# Patient Record
Sex: FEMALE | Race: BLACK OR AFRICAN AMERICAN | NOT HISPANIC OR LATINO | Employment: OTHER | URBAN - METROPOLITAN AREA
[De-identification: names, ages, dates, MRNs, and addresses within clinical notes are randomized per-mention and may not be internally consistent; named-entity substitution may affect disease eponyms.]

---

## 2018-02-08 ENCOUNTER — TRANSCRIBE ORDERS (OUTPATIENT)
Dept: ADMINISTRATIVE | Facility: HOSPITAL | Age: 63
End: 2018-02-08

## 2018-02-08 DIAGNOSIS — M17.11 OSTEOARTHRITIS OF RIGHT KNEE, UNSPECIFIED OSTEOARTHRITIS TYPE: Primary | ICD-10-CM

## 2018-02-22 ENCOUNTER — APPOINTMENT (OUTPATIENT)
Dept: RADIOLOGY | Facility: CLINIC | Age: 63
End: 2018-02-22
Payer: COMMERCIAL

## 2018-02-22 DIAGNOSIS — M17.11 OSTEOARTHRITIS OF RIGHT KNEE, UNSPECIFIED OSTEOARTHRITIS TYPE: ICD-10-CM

## 2018-02-22 PROCEDURE — 73562 X-RAY EXAM OF KNEE 3: CPT

## 2018-11-19 ENCOUNTER — TELEPHONE (OUTPATIENT)
Dept: PAIN MEDICINE | Facility: CLINIC | Age: 63
End: 2018-11-19

## 2018-11-26 NOTE — TELEPHONE ENCOUNTER
Pt called stating that she was referred to Dr Nano North and wanted to know if she could schedule an appointment  Pt made aware that we did receive the referral and are waiting to get her prior records sent from Dr Hernandez Kos office  Let patient know that once we receive her records and Dr Nano North has a chance to review them, she will get a call back from the office  Pt appreciative

## 2018-12-03 NOTE — TELEPHONE ENCOUNTER
Lm on vm for pt to c/b  Pls let her know we have not received med recs from Dr Christina Cheung and there are none in the system  She can have them fax them to us

## 2018-12-03 NOTE — TELEPHONE ENCOUNTER
S/w patient, she will call Dr Gómez Dexter office and provide fax # [phone] to have records sent

## 2018-12-05 NOTE — TELEPHONE ENCOUNTER
S/w patient, no intake within in EHR, permission to schedule per notes from office/Dr Deuce Perez  Patient has [employer], no referral in EHR  Per patient she is being referred by Dr Jessenia Nash who can no longer give her injections  Per patient she has LBP and herniated discs in her low back  Patient is scheduled for consult 1/17/18    Patient advised that they should receive the NP paperwork in the mail prior to their appointment  If they do not receive the paperwork; or if the appointment is within 3-7 days they can print it off the spine and pain website:  [employer] or Arrive 45 minutes prior to their appointment time, or retrieve it from the practice in advance to their appointment  It will take approximately 30 minutes to complete         Patient requests NP pw mailed to address confirmed on file

## 2019-01-07 ENCOUNTER — OFFICE VISIT (OUTPATIENT)
Dept: PAIN MEDICINE | Facility: CLINIC | Age: 64
End: 2019-01-07
Payer: COMMERCIAL

## 2019-01-07 VITALS
HEART RATE: 60 BPM | WEIGHT: 177 LBS | HEIGHT: 60 IN | BODY MASS INDEX: 34.75 KG/M2 | DIASTOLIC BLOOD PRESSURE: 90 MMHG | SYSTOLIC BLOOD PRESSURE: 146 MMHG

## 2019-01-07 DIAGNOSIS — M19.011 ARTHRITIS OF RIGHT SHOULDER REGION: ICD-10-CM

## 2019-01-07 DIAGNOSIS — G89.4 CHRONIC PAIN SYNDROME: ICD-10-CM

## 2019-01-07 DIAGNOSIS — M54.16 LUMBAR RADICULOPATHY: Primary | ICD-10-CM

## 2019-01-07 DIAGNOSIS — M17.11 PRIMARY OSTEOARTHRITIS OF RIGHT KNEE: ICD-10-CM

## 2019-01-07 PROCEDURE — 99204 OFFICE O/P NEW MOD 45 MIN: CPT | Performed by: ANESTHESIOLOGY

## 2019-01-07 RX ORDER — LOSARTAN POTASSIUM 50 MG/1
50 TABLET ORAL DAILY
Refills: 3 | COMMUNITY
Start: 2018-11-08

## 2019-01-07 RX ORDER — GABAPENTIN 800 MG/1
TABLET ORAL
COMMUNITY

## 2019-01-07 RX ORDER — CARVEDILOL 6.25 MG/1
6.25 TABLET ORAL
COMMUNITY
Start: 2018-11-13

## 2019-01-07 RX ORDER — AMLODIPINE BESYLATE 5 MG/1
5 TABLET ORAL
COMMUNITY
Start: 2018-11-13

## 2019-01-07 RX ORDER — HYDROCODONE BITARTRATE AND ACETAMINOPHEN 5; 325 MG/1; MG/1
1 TABLET ORAL 3 TIMES DAILY PRN
Refills: 0 | COMMUNITY
Start: 2018-12-15

## 2019-01-07 NOTE — PROGRESS NOTES
Assessment:  1  Lumbar radiculopathy  MRI lumbar spine without contrast   2  Chronic pain syndrome     3  Primary osteoarthritis of right knee  [place] spine and pain procedure   4  Arthritis of right shoulder region       Plan: This is a 58-year-old female who presents today for initial consultation for management of low back and bilateral knee pain  Patient has completed a 6 weak course of physical therapy  X-ray of right knee demonstrates osteoarthritis and medial compartment joint space narrowing   At this time, I recommend she resumes physical therapy for now  In in the meantime, I will order imaging study, MRI of the lumbosacral spine without contrast   Patient may benefit from pain interventions  In the interim, patient will continue with gabapentin 800 mg p o  t i d  and hydrocodone acetaminophen 5/325 mg p o  t i d  Prescriptions are written by Dr Alysa Varma  Follow up 6 weeks review MRI and reassess PT progress  My impressions and treatment recommendations were discussed in detail with the patient who verbalized understanding and had no further questions  Discharge instructions were provided  I personally saw and examined the patient and I agree with the above discussed plan of care  No orders of the defined types were placed in this encounter  New Medications Ordered This Visit   Medications    amLODIPine (NORVASC) 5 mg tablet     Sig: Take 5 mg by mouth    carvedilol (COREG) 6 25 mg tablet     Sig: Take 6 25 mg by mouth    losartan (COZAAR) 50 mg tablet     Sig: Take 50 mg by mouth daily     Refill:  3    gabapentin (NEURONTIN) 800 mg tablet     Sig: TAKE 1 TABLET 3 TIMES A DAY       HYDROcodone-acetaminophen (NORCO) 5-325 mg per tablet     Sig: Take 1 tablet by mouth 3 (three) times a day as needed     Refill:  0       History of Present Illness:    Valerio Dave is a 59 y o  female who presents today for initial consultation for management of low back pain and bilateral knee pain  Of note, patient reports that her pain is due to stroke, arthritis  She reports moderate to severe pain  Pain is rated 10/10  Pain is constant, worse in the morning  Patient further describes pain as burning, shooting, sharp, pressure-like and throbbing sensation  Low back pain radiates down her legs bilaterally  Her pain is aggravated with prolonged standing, bending, sitting, walking and exercise  Patient has completed physical therapy for 6 weeks with moderate relief of pain  She has resumed PT again  She uses heat and ice treatment  She reports prior injection which helped  Her last injection was several years ago  Patient is currently on hydrocodone acetaminophen 5/325 mg p o  t i d  She also takes gabapentin 800 mg p o  t i d  She denies loss of bladder and or bowel  She denies fever or chills  I have personally reviewed and/or updated the patient's past medical history, past surgical history, family history, social history, current medications, allergies, and vital signs today  Review of Systems:    Review of Systems   Constitutional: Negative for fever and unexpected weight change  HENT: Positive for trouble swallowing  Eyes: Negative for visual disturbance  Respiratory: Positive for shortness of breath  Negative for wheezing  Cardiovascular: Negative for chest pain and palpitations  Gastrointestinal: Positive for constipation  Negative for diarrhea, nausea and vomiting  Endocrine: Negative for cold intolerance, heat intolerance and polydipsia  Genitourinary: Positive for difficulty urinating  Negative for frequency  Musculoskeletal: Positive for gait problem  Negative for arthralgias, joint swelling and myalgias  Joint stiffness, Decreased ROM   Skin: Negative for rash  Neurological: Positive for dizziness and weakness  Negative for seizures, syncope and headaches  Hematological: Bruises/bleeds easily  Psychiatric/Behavioral: Negative for dysphoric mood  All other systems reviewed and are negative  There is no problem list on file for this patient  No past medical history on file  No past surgical history on file  No family history on file  Social History     Occupational History    Not on file  Social History Main Topics    Smoking status: Not on file    Smokeless tobacco: Not on file    Alcohol use Not on file    Drug use: Unknown    Sexual activity: Not on file       No current outpatient prescriptions on file prior to visit  No current facility-administered medications on file prior to visit  Allergies   Allergen Reactions    Latex Hives    Oxycodone-Acetaminophen Other (See Comments) and Hives    Sulfa Antibiotics Hives       Physical Exam:    /90   Pulse 60   Ht 5' (1 524 m)   Wt 80 3 kg (177 lb)   BMI 34 57 kg/m²     Constitutional: normal, well developed, well nourished, alert, in no distress and non-toxic and no overt pain behavior    Eyes: anicteric  HEENT: grossly intact  Neck: supple, symmetric, trachea midline and no masses   Pulmonary:even and unlabored  Cardiovascular:No edema or pitting edema present  Skin:Normal without rashes or lesions and well hydrated  Psychiatric:Mood and affect appropriate  Neurologic:Cranial Nerves II-XII grossly intact  Musculoskeletal:normal    Lumbar Spine Exam    Appearance:  Normal lordosis  Palpation/Tenderness:  left lumbar paraspinal tenderness  right lumbar paraspinal tenderness  Sensory:  no sensory deficits noted  Range of Motion:  Extension:  Moderately limited  with pain  Motor Strength:  Left foot dorsiflexion:  5/5  Left foot plantar flexion:  5/5  Right foot dorsiflexion:  3/5  Right foot plantar flexion:  4/5  Reflexes:  Left Patellar:  2+   Right Patellar:  2+     Imaging

## 2019-01-08 ENCOUNTER — TELEPHONE (OUTPATIENT)
Dept: RADIOLOGY | Facility: CLINIC | Age: 64
End: 2019-01-08

## 2019-01-22 ENCOUNTER — HOSPITAL ENCOUNTER (OUTPATIENT)
Dept: RADIOLOGY | Facility: CLINIC | Age: 64
Discharge: HOME | End: 2019-01-22
Attending: ANESTHESIOLOGY | Admitting: ANESTHESIOLOGY
Payer: COMMERCIAL

## 2019-01-22 VITALS
RESPIRATION RATE: 18 BRPM | DIASTOLIC BLOOD PRESSURE: 73 MMHG | OXYGEN SATURATION: 92 % | TEMPERATURE: 98.3 F | HEART RATE: 99 BPM | SYSTOLIC BLOOD PRESSURE: 94 MMHG

## 2019-01-22 DIAGNOSIS — M17.11 PRIMARY OSTEOARTHRITIS OF RIGHT KNEE: ICD-10-CM

## 2019-01-22 PROCEDURE — 20610 DRAIN/INJ JOINT/BURSA W/O US: CPT | Performed by: ANESTHESIOLOGY

## 2019-01-22 PROCEDURE — 77002 NEEDLE LOCALIZATION BY XRAY: CPT | Performed by: ANESTHESIOLOGY

## 2019-01-22 PROCEDURE — 77002 NEEDLE LOCALIZATION BY XRAY: CPT

## 2019-01-22 RX ORDER — METHYLPREDNISOLONE ACETATE 80 MG/ML
80 INJECTION, SUSPENSION INTRA-ARTICULAR; INTRALESIONAL; INTRAMUSCULAR; PARENTERAL; SOFT TISSUE ONCE
Status: COMPLETED | OUTPATIENT
Start: 2019-01-22 | End: 2019-01-22

## 2019-01-22 RX ORDER — BUPIVACAINE HCL/PF 2.5 MG/ML
5 VIAL (ML) INJECTION ONCE
Status: COMPLETED | OUTPATIENT
Start: 2019-01-22 | End: 2019-01-22

## 2019-01-22 RX ORDER — LIDOCAINE HYDROCHLORIDE 10 MG/ML
5 INJECTION, SOLUTION EPIDURAL; INFILTRATION; INTRACAUDAL; PERINEURAL ONCE
Status: COMPLETED | OUTPATIENT
Start: 2019-01-22 | End: 2019-01-22

## 2019-01-22 RX ADMIN — IOHEXOL 1 ML: 300 INJECTION, SOLUTION INTRAVENOUS at 14:18

## 2019-01-22 RX ADMIN — Medication 5 ML: at 14:18

## 2019-01-22 RX ADMIN — LIDOCAINE HYDROCHLORIDE 5 ML: 10 INJECTION, SOLUTION EPIDURAL; INFILTRATION; INTRACAUDAL; PERINEURAL at 14:17

## 2019-01-22 RX ADMIN — METHYLPREDNISOLONE ACETATE 80 MG: 80 INJECTION, SUSPENSION INTRA-ARTICULAR; INTRALESIONAL; INTRAMUSCULAR; PARENTERAL; SOFT TISSUE at 14:20

## 2019-01-22 NOTE — INTERVAL H&P NOTE
Update: (This section must be completed if the H&P was completed greater than 24 hrs to procedure or admission)    H&P reviewed  After examining the patient, I find no changed to the H&P since it had been written  Patient re-evaluated   Accept as history and physical     Meli Montoya MD/January 22, 2019/1:58 PM

## 2019-01-22 NOTE — H&P (VIEW-ONLY)
Assessment:  1  Lumbar radiculopathy  MRI lumbar spine without contrast   2  Chronic pain syndrome     3  Primary osteoarthritis of right knee  [place] spine and pain procedure   4  Arthritis of right shoulder region       Plan: This is a 79-year-old female who presents today for initial consultation for management of low back and bilateral knee pain  Patient has completed a 6 weak course of physical therapy  X-ray of right knee demonstrates osteoarthritis and medial compartment joint space narrowing   At this time, I recommend she resumes physical therapy for now  In in the meantime, I will order imaging study, MRI of the lumbosacral spine without contrast   Patient may benefit from pain interventions  In the interim, patient will continue with gabapentin 800 mg p o  t i d  and hydrocodone acetaminophen 5/325 mg p o  t i d  Prescriptions are written by Dr Norwood Fly  Follow up 6 weeks review MRI and reassess PT progress  My impressions and treatment recommendations were discussed in detail with the patient who verbalized understanding and had no further questions  Discharge instructions were provided  I personally saw and examined the patient and I agree with the above discussed plan of care  No orders of the defined types were placed in this encounter  New Medications Ordered This Visit   Medications    amLODIPine (NORVASC) 5 mg tablet     Sig: Take 5 mg by mouth    carvedilol (COREG) 6 25 mg tablet     Sig: Take 6 25 mg by mouth    losartan (COZAAR) 50 mg tablet     Sig: Take 50 mg by mouth daily     Refill:  3    gabapentin (NEURONTIN) 800 mg tablet     Sig: TAKE 1 TABLET 3 TIMES A DAY       HYDROcodone-acetaminophen (NORCO) 5-325 mg per tablet     Sig: Take 1 tablet by mouth 3 (three) times a day as needed     Refill:  0       History of Present Illness:    America Dave is a 59 y o  female who presents today for initial consultation for management of low back pain and bilateral knee pain  Of note, patient reports that her pain is due to stroke, arthritis  She reports moderate to severe pain  Pain is rated 10/10  Pain is constant, worse in the morning  Patient further describes pain as burning, shooting, sharp, pressure-like and throbbing sensation  Low back pain radiates down her legs bilaterally  Her pain is aggravated with prolonged standing, bending, sitting, walking and exercise  Patient has completed physical therapy for 6 weeks with moderate relief of pain  She has resumed PT again  She uses heat and ice treatment  She reports prior injection which helped  Her last injection was several years ago  Patient is currently on hydrocodone acetaminophen 5/325 mg p o  t i d  She also takes gabapentin 800 mg p o  t i d  She denies loss of bladder and or bowel  She denies fever or chills  I have personally reviewed and/or updated the patient's past medical history, past surgical history, family history, social history, current medications, allergies, and vital signs today  Review of Systems:    Review of Systems   Constitutional: Negative for fever and unexpected weight change  HENT: Positive for trouble swallowing  Eyes: Negative for visual disturbance  Respiratory: Positive for shortness of breath  Negative for wheezing  Cardiovascular: Negative for chest pain and palpitations  Gastrointestinal: Positive for constipation  Negative for diarrhea, nausea and vomiting  Endocrine: Negative for cold intolerance, heat intolerance and polydipsia  Genitourinary: Positive for difficulty urinating  Negative for frequency  Musculoskeletal: Positive for gait problem  Negative for arthralgias, joint swelling and myalgias  Joint stiffness, Decreased ROM   Skin: Negative for rash  Neurological: Positive for dizziness and weakness  Negative for seizures, syncope and headaches  Hematological: Bruises/bleeds easily  Psychiatric/Behavioral: Negative for dysphoric mood  All other systems reviewed and are negative  There is no problem list on file for this patient  No past medical history on file  No past surgical history on file  No family history on file  Social History     Occupational History    Not on file  Social History Main Topics    Smoking status: Not on file    Smokeless tobacco: Not on file    Alcohol use Not on file    Drug use: Unknown    Sexual activity: Not on file       No current outpatient prescriptions on file prior to visit  No current facility-administered medications on file prior to visit  Allergies   Allergen Reactions    Latex Hives    Oxycodone-Acetaminophen Other (See Comments) and Hives    Sulfa Antibiotics Hives       Physical Exam:    /90   Pulse 60   Ht 5' (1 524 m)   Wt 80 3 kg (177 lb)   BMI 34 57 kg/m²     Constitutional: normal, well developed, well nourished, alert, in no distress and non-toxic and no overt pain behavior    Eyes: anicteric  HEENT: grossly intact  Neck: supple, symmetric, trachea midline and no masses   Pulmonary:even and unlabored  Cardiovascular:No edema or pitting edema present  Skin:Normal without rashes or lesions and well hydrated  Psychiatric:Mood and affect appropriate  Neurologic:Cranial Nerves II-XII grossly intact  Musculoskeletal:normal    Lumbar Spine Exam    Appearance:  Normal lordosis  Palpation/Tenderness:  left lumbar paraspinal tenderness  right lumbar paraspinal tenderness  Sensory:  no sensory deficits noted  Range of Motion:  Extension:  Moderately limited  with pain  Motor Strength:  Left foot dorsiflexion:  5/5  Left foot plantar flexion:  5/5  Right foot dorsiflexion:  3/5  Right foot plantar flexion:  4/5  Reflexes:  Left Patellar:  2+   Right Patellar:  2+     Imaging

## 2019-01-22 NOTE — DISCHARGE INSTR - LAB
Steroid Joint Injection   WHAT YOU NEED TO KNOW:   A steroid joint injection is a procedure to inject steroid medicine into a joint  Steroid medicine decreases pain and inflammation  The injection may also contain an anesthetic (numbing medicine) to decrease pain  It may be done to treat conditions such as arthritis, gout, or carpal tunnel syndrome  The injections may be given in your knee, ankle, shoulder, elbow, wrist, ankle or sacroiliac joint  1  Do not apply heat to any area that is numb  If you have discomfort or soreness at the injection site, you may apply ice today, 20 minutes on and 20 minutes off  Tomorrow you may use ice or warm, moist heat  Do not apply ice or heat directly to the skin  2  You may have an increase or change in the discomfort for 36-48 hours after your treatment  Apply ice and continue with any pain medicine you have been prescribed  3  Do not do anything strenuous today  You may shower, but no tub baths or hot tubs today  You may resume your normal activities tomorrow, but do not overdo it  Resume normal activities slowly when you are feeling better  4  If you experience redness, drainage or swelling at the injection site, or if you develop a fever above 100 degrees, please call [facility] at [phone] or go to the Emergency Room  5  Continue to take all routine medicines prescribed by your primary care physician unless otherwise instructed by our staff  Most blood thinners should be started again according to your regularly scheduled dosing  If you have any questions, please give our office a call  If you have a problem specifically related to your procedure, please call our office at [phone]  Problems not related to your procedure should be directed to your primary care physician

## 2019-01-23 ENCOUNTER — HOSPITAL ENCOUNTER (OUTPATIENT)
Dept: MRI IMAGING | Facility: HOSPITAL | Age: 64
Discharge: HOME | End: 2019-01-23
Attending: ANESTHESIOLOGY
Payer: COMMERCIAL

## 2019-01-23 DIAGNOSIS — M54.16 LUMBAR RADICULOPATHY: ICD-10-CM

## 2019-01-23 PROCEDURE — 72148 MRI LUMBAR SPINE W/O DYE: CPT

## 2019-01-29 ENCOUNTER — TELEPHONE (OUTPATIENT)
Dept: PAIN MEDICINE | Facility: CLINIC | Age: 64
End: 2019-01-29

## 2019-01-30 ENCOUNTER — APPOINTMENT (OUTPATIENT)
Dept: RADIOLOGY | Facility: CLINIC | Age: 64
End: 2019-01-30
Payer: COMMERCIAL

## 2019-01-30 ENCOUNTER — OFFICE VISIT (OUTPATIENT)
Dept: OBGYN CLINIC | Facility: CLINIC | Age: 64
End: 2019-01-30
Payer: COMMERCIAL

## 2019-01-30 VITALS
DIASTOLIC BLOOD PRESSURE: 75 MMHG | SYSTOLIC BLOOD PRESSURE: 112 MMHG | BODY MASS INDEX: 32.79 KG/M2 | HEIGHT: 60 IN | WEIGHT: 167 LBS | HEART RATE: 83 BPM

## 2019-01-30 DIAGNOSIS — M25.511 RIGHT SHOULDER PAIN, UNSPECIFIED CHRONICITY: ICD-10-CM

## 2019-01-30 DIAGNOSIS — M25.511 RIGHT SHOULDER PAIN, UNSPECIFIED CHRONICITY: Primary | ICD-10-CM

## 2019-01-30 DIAGNOSIS — M19.011 ARTHRITIS OF RIGHT SHOULDER REGION: ICD-10-CM

## 2019-01-30 DIAGNOSIS — M54.12 RADICULOPATHY, CERVICAL REGION: ICD-10-CM

## 2019-01-30 PROCEDURE — 99241 PR OFFICE CONSULTATION NEW/ESTAB PATIENT 15 MIN: CPT | Performed by: ORTHOPAEDIC SURGERY

## 2019-01-30 PROCEDURE — 72050 X-RAY EXAM NECK SPINE 4/5VWS: CPT

## 2019-01-30 PROCEDURE — 73030 X-RAY EXAM OF SHOULDER: CPT

## 2019-01-30 NOTE — PROGRESS NOTES
Progress Note - Orthopedics   Elina Dave 62 y o  female MRN: [id number]  Unit/Bed#:  Encounter: [id number]    Assessment:  Right shoulder pain    Plan:  PATIENT WAS NOT SEEN FOLLOWING XRAY,SHE THOUGHT VISIT WAS COMPLETE AND LEFT THE OFFICE  SHE DID NOT SEE DR PARMAR  PATIENT WAS CALLED AND MADE APPOINTMENT FOR ANOTHER TIME  Subjective:     62year old female in for evaluation of right shoulder pain  Pain ongoing for the past 2 years with no associated injury or trauma  She is having neck pain as well, pain looking down and with lateral rotation to the right, does feel weakness in her arm  Diffuse tenderness to palpation upper trap, superior scapular region, neck that will refer down her arm  Vitals: There were no vitals taken for this visit  ,There is no height or weight on file to calculate BMI      [unfilled]  Invasive Devices          No matching active lines, drains, or airways          Physical Exam: /75   Pulse 83   Ht 5' (1 524 m)   Wt 75 8 kg (167 lb)   BMI 32 61 kg/m²     General Appearance:    Alert, cooperative, no distress, appears stated age   Head:    Normocephalic, without obvious abnormality, atraumatic   Eyes:    PERRL, conjunctiva/corneas clear, EOM's intact, fundi     benign, both eyes   Ears:    Normal TM's and external ear canals, both ears   Nose:   Nares normal, septum midline, mucosa normal, no drainage    or sinus tenderness   Throat:   Lips, mucosa, and tongue normal; teeth and gums normal   Neck:   Supple, symmetrical, trachea midline, no adenopathy;     thyroid:  no enlargement/tenderness/nodules; no carotid    bruit or JVD   Back:     Symmetric, no curvature, ROM normal, no CVA tenderness   Lungs:     Clear to auscultation bilaterally, respirations unlabored   Chest Wall:    No tenderness or deformity    Heart:    Regular rate and rhythm, S1 and S2 normal, no murmur, rub   or gallop   Breast Exam:    No tenderness, masses, or nipple abnormality   Abdomen: Soft, non-tender, bowel sounds active all four quadrants,     no masses, no organomegaly   Genitalia:    Normal female without lesion, discharge or tenderness   Rectal:    Normal tone, no masses or tenderness; guaiac negative stool   Extremities:   Extremities normal, atraumatic, no cyanosis or edema   Pulses:   2+ and symmetric all extremities   Skin:   Skin color, texture, turgor normal, no rashes or lesions   Lymph nodes:   Cervical, supraclavicular, and axillary nodes normal   Neurologic:   CNII-XII intact, normal strength, sensation and reflexes     throughout       Neurovascular:  Sensation intact in Ax/R/M/U nerve distributions  2+ radial pulse  Lab, Imaging and other studies:  3 views of right shoulder demonstrate no fracture, osseus abnormality, no dislocation      2 views of cervical spine demonstrate loss of cervical lordosis with spondylosis at C5/C6 level        Scribe Attestation    I,:   Radha Valiente am acting as a scribe while in the presence of the attending physician :        I,:   Terra Chong MD personally performed the services described in this documentation    as scribed in my presence :

## 2019-01-31 ENCOUNTER — TELEPHONE (OUTPATIENT)
Dept: OBGYN CLINIC | Facility: HOSPITAL | Age: 64
End: 2019-01-31

## 2019-02-06 NOTE — TELEPHONE ENCOUNTER
Patient is calling   [phone]    Patient is stating that she received our letter  Patient is stating that she is a 10   % of relief 1%    Patient is stating that she is aching everywhere  She can barely stand up on her own

## 2019-02-28 ENCOUNTER — TELEPHONE (OUTPATIENT)
Dept: PAIN MEDICINE | Facility: CLINIC | Age: 64
End: 2019-02-28

## 2019-02-28 ENCOUNTER — OFFICE VISIT (OUTPATIENT)
Dept: PAIN MEDICINE | Facility: CLINIC | Age: 64
End: 2019-02-28
Payer: COMMERCIAL

## 2019-02-28 VITALS
HEART RATE: 68 BPM | BODY MASS INDEX: 32.79 KG/M2 | SYSTOLIC BLOOD PRESSURE: 144 MMHG | HEIGHT: 60 IN | WEIGHT: 167 LBS | DIASTOLIC BLOOD PRESSURE: 74 MMHG

## 2019-02-28 DIAGNOSIS — M17.11 PRIMARY OSTEOARTHRITIS OF RIGHT KNEE: Primary | ICD-10-CM

## 2019-02-28 DIAGNOSIS — M47.816 LUMBAR SPONDYLOSIS: ICD-10-CM

## 2019-02-28 PROCEDURE — 99214 OFFICE O/P EST MOD 30 MIN: CPT | Performed by: ANESTHESIOLOGY

## 2019-02-28 NOTE — PROGRESS NOTES
Assessment:  1  Primary osteoarthritis of right knee  Ambulatory referral to Orthopedic Surgery   2  Lumbar spondylosis - Bilateral  FL spine and pain procedure       Plan: This is a 72-year-old female who presents today for follow-up visit for management of worsening low back pain  Today, I reviewed her MRI results with her in detail and answered all her questions to her satisfaction  The patient's pain persists despite time, relative rest, activity modification and therapy  Based on the patient's symptoms and examination, I suspect that a component of pain is being generated by the facet joints  The facet joints are only one of several possible axial pain generators  This was reviewed with the patient today  We will move forward with medial branch blockade at levels [bilateral L3-L5] utilizing a double block paradigm  If the patient receives significant relief of appropriate duration with 2% lidocaine, we will confirm with   25% bupivacaine  If the patient demonstrates appropriate response to medial branch blockade we will schedule radiofrequency ablation of the lumbar medial branches to essentially denervate the facet joints  In the office today, we reviewed the nature of the patient's pathology in depth using diagrams and models  We discussed the approach we would take for the medial branch block and provided literature for home review  The patient understands the risks associated with the procedure including bleeding, infection, tissue action, allergic reaction, nerve injury and verbal and written consent was obtained today  Refer to Ortho regarding right and left knee pain  Patient will resume PT  My impressions and treatment recommendations were discussed in detail with the patient who verbalized understanding and had no further questions  Discharge instructions were provided  I personally saw and examined the patient and I agree with the above discussed plan of care      History of Present Illness:  Mike Johnson is a 62 y o  female who presents for a follow up office visit in regards to Back Pain and Knee Pain  The patients current symptoms includes constant, dull/achy low back pain and right knee pain  Patient had a right knee injection which she states did not help with her pain  Today she reports worsening low back pain  Her pain is rated 10/10  Patient is currently on hydrocodone acetaminophen 5/325 mg p o  T i d  She also takes gabapentin 800 mg up to 3 times a day  MRI of the lumbosacral spine demonstrates moderate to severe stenosis with anterolisthesis  I have personally reviewed and/or updated the patient's past medical history, past surgical history, family history, social history, current medications, allergies, and vital signs today  Review of Systems   Respiratory: Positive for shortness of breath  Cardiovascular: Negative for chest pain  Gastrointestinal: Positive for constipation  Negative for diarrhea, nausea and vomiting  Musculoskeletal: Positive for back pain and gait problem  Negative for arthralgias, joint swelling and myalgias  Decreased ROM, Joint stiffness   Skin: Negative for rash  Neurological: Positive for weakness  Negative for dizziness and seizures  All other systems reviewed and are negative  Patient Active Problem List   Diagnosis    Lumbar radiculopathy    Chronic pain syndrome    Primary osteoarthritis of right knee       Past Medical History:   Diagnosis Date    Arthritis     Heart disease     Hypertension        No past surgical history on file      Family History   Adopted: Yes   Problem Relation Age of Onset    Heart disease Sister        Social History     Occupational History    Not on file   Tobacco Use    Smoking status: Former Smoker     Last attempt to quit: 2009     Years since quitting: 10 1    Smokeless tobacco: Never Used   Substance and Sexual Activity    Alcohol use: Not on file    Drug use: Not on file    Sexual activity: Not on file       Current Outpatient Medications on File Prior to Visit   Medication Sig    amLODIPine (NORVASC) 5 mg tablet Take 5 mg by mouth    carvedilol (COREG) 6 25 mg tablet Take 6 25 mg by mouth    gabapentin (NEURONTIN) 800 mg tablet TAKE 1 TABLET 3 TIMES A DAY       HYDROcodone-acetaminophen (NORCO) 5-325 mg per tablet Take 1 tablet by mouth 3 (three) times a day as needed    losartan (COZAAR) 50 mg tablet Take 50 mg by mouth daily     No current facility-administered medications on file prior to visit  Allergies   Allergen Reactions    Latex Hives    Oxycodone-Acetaminophen Other (See Comments) and Hives    Sulfa Antibiotics Hives       Physical Exam:    /74   Pulse 68   Ht 5' (1 524 m)   Wt 75 8 kg (167 lb)   BMI 32 61 kg/m²     Constitutional:normal, well developed, well nourished, alert, in no distress and non-toxic and no overt pain behavior  Eyes:anicteric  HEENT:grossly intact  Neck:supple, symmetric, trachea midline and no masses   Pulmonary:even and unlabored  Cardiovascular:No edema or pitting edema present  Skin:Normal without rashes or lesions and well hydrated  Psychiatric:Mood and affect appropriate  Neurologic:Cranial Nerves II-XII grossly intact  Musculoskeletal:normal    Positive facet loading of lumbar spine   Imaging  MRI LUMBAR SPINE WITHOUT CONTRAST     INDICATION: [id number]: Radiculopathy, lumbar region  Low back pain for several years  Injury  Pain radiates to right leg causing numbness and pain in the right foot      COMPARISON:  None      TECHNIQUE:  Sagittal T1, sagittal T2, sagittal inversion recovery, axial T1 and axial T2, coronal T2        IMAGE QUALITY:  Diagnostic     FINDINGS:     VERTEBRAL BODIES:  L4-5 anterolisthesis measures 8mm which is stable from the prior study  Normal marrow signal is identified within the visualized bony structures  No discrete marrow lesion      SACRUM:  Normal signal within the sacrum   No evidence of insufficiency or stress fracture      DISTAL CORD AND CONUS:  Normal size and signal within the distal cord and conus        PARASPINAL SOFT TISSUES:  Paraspinal soft tissues are unremarkable      LOWER THORACIC DISC SPACES:  T10-11: Facet hypertrophy with mild central stenosis  Axial images not obtained to this level      LUMBAR DISC SPACES:     L1-L2:  Mild facet hypertrophy without central or foraminal narrowing      L2-L3:  Moderate facet hypertrophy results in mild central stenosis  Foramina are patent      L3-L4:  Moderate facet hypertrophy  Minimal annular bulge results in minimal central stenosis      L4-L5:  Severe facet degenerative change accounts for anterolisthesis  Facet joints appear at least partially fused  Moderate to severe central stenosis and bilateral lateral recess stenosis noted      L5-S1:  Moderate facet hypertrophy  Mild annular bulge with small marginal osteophytes noted  Minimal right foraminal narrowing      IMPRESSION:     Severe facet degenerative change L4-5 accounts for anterolisthesis with uncovering the posterior disc margin    There is moderate to severe central stenosis and bilateral lateral recess stenosis      Mild degenerative disease elsewhere

## 2019-02-28 NOTE — TELEPHONE ENCOUNTER
Patient left voice message on [facility] answering machine,, please call pt to reschedule

## 2019-03-07 NOTE — TELEPHONE ENCOUNTER
Called the # listed and there was a recording that stated the person called is not accepting calls at this time

## 2019-03-21 NOTE — TELEPHONE ENCOUNTER
Attempt to call the phone # listed again, but the same error message came up  I sent a letter re rescheduling appointment

## 2019-03-28 ENCOUNTER — TELEPHONE (OUTPATIENT)
Dept: RADIOLOGY | Facility: CLINIC | Age: 64
End: 2019-03-28

## 2019-03-28 NOTE — TELEPHONE ENCOUNTER
----- Message from Gloria Lubin RN sent at 3/28/2019 10:46 AM EDT -----  Regarding: FW: schedule injection      ----- Message -----  From: Radha Price  Sent: 3/28/2019  10:12 AM  To: Fanny Webb MD, #  Subject: schedule injection                               S/w patient last seen 2/28/19, was in the hospital would like to schedule the injection

## 2019-04-04 ENCOUNTER — HOSPITAL ENCOUNTER (OUTPATIENT)
Dept: RADIOLOGY | Facility: CLINIC | Age: 64
Discharge: HOME | End: 2019-04-04
Attending: ANESTHESIOLOGY | Admitting: ANESTHESIOLOGY
Payer: COMMERCIAL

## 2019-04-04 VITALS
RESPIRATION RATE: 20 BRPM | OXYGEN SATURATION: 98 % | DIASTOLIC BLOOD PRESSURE: 84 MMHG | HEART RATE: 50 BPM | TEMPERATURE: 97.5 F | SYSTOLIC BLOOD PRESSURE: 136 MMHG

## 2019-04-04 DIAGNOSIS — M47.816 LUMBAR SPONDYLOSIS: ICD-10-CM

## 2019-04-04 PROCEDURE — 64494 INJ PARAVERT F JNT L/S 2 LEV: CPT | Performed by: ANESTHESIOLOGY

## 2019-04-04 PROCEDURE — 64493 INJ PARAVERT F JNT L/S 1 LEV: CPT | Performed by: ANESTHESIOLOGY

## 2019-04-04 RX ADMIN — Medication 5 ML: at 15:08

## 2019-04-10 ENCOUNTER — TELEPHONE (OUTPATIENT)
Dept: RADIOLOGY | Facility: CLINIC | Age: 64
End: 2019-04-10

## 2019-05-16 ENCOUNTER — HOSPITAL ENCOUNTER (OUTPATIENT)
Dept: RADIOLOGY | Facility: CLINIC | Age: 64
Discharge: HOME | End: 2019-05-16
Attending: ANESTHESIOLOGY | Admitting: ANESTHESIOLOGY
Payer: COMMERCIAL

## 2019-05-16 VITALS
TEMPERATURE: 97.6 F | SYSTOLIC BLOOD PRESSURE: 141 MMHG | OXYGEN SATURATION: 94 % | DIASTOLIC BLOOD PRESSURE: 99 MMHG | RESPIRATION RATE: 18 BRPM | HEART RATE: 101 BPM

## 2019-05-16 DIAGNOSIS — M47.816 LUMBAR SPONDYLOSIS: ICD-10-CM

## 2019-05-16 PROCEDURE — 64493 INJ PARAVERT F JNT L/S 1 LEV: CPT | Performed by: ANESTHESIOLOGY

## 2019-05-16 PROCEDURE — 64494 INJ PARAVERT F JNT L/S 2 LEV: CPT | Performed by: ANESTHESIOLOGY

## 2019-05-16 RX ORDER — BUPIVACAINE HCL/PF 2.5 MG/ML
5 VIAL (ML) INJECTION ONCE
Status: COMPLETED | OUTPATIENT
Start: 2019-05-16 | End: 2019-05-16

## 2019-05-16 RX ORDER — RIVAROXABAN 20 MG/1
20 TABLET, FILM COATED ORAL
Refills: 5 | COMMUNITY
Start: 2019-05-02

## 2019-05-16 RX ORDER — METHIMAZOLE 10 MG/1
10 TABLET ORAL 2 TIMES DAILY
Refills: 0 | COMMUNITY
Start: 2019-04-24

## 2019-05-16 RX ORDER — LISINOPRIL 5 MG/1
5 TABLET ORAL DAILY
Refills: 0 | COMMUNITY
Start: 2019-04-25

## 2019-05-16 RX ORDER — FUROSEMIDE 40 MG/1
20 TABLET ORAL DAILY
COMMUNITY

## 2019-05-16 RX ORDER — METOPROLOL TARTRATE 100 MG/1
TABLET ORAL
Refills: 1 | COMMUNITY
Start: 2019-04-30

## 2019-05-16 RX ADMIN — Medication 5 ML: at 13:21

## 2019-05-28 ENCOUNTER — TELEPHONE (OUTPATIENT)
Dept: RADIOLOGY | Facility: CLINIC | Age: 64
End: 2019-05-28

## 2019-07-11 ENCOUNTER — HOSPITAL ENCOUNTER (OUTPATIENT)
Dept: RADIOLOGY | Facility: CLINIC | Age: 64
Discharge: HOME | End: 2019-07-11
Attending: ANESTHESIOLOGY | Admitting: ANESTHESIOLOGY
Payer: COMMERCIAL

## 2019-07-11 ENCOUNTER — TELEPHONE (OUTPATIENT)
Dept: RADIOLOGY | Facility: CLINIC | Age: 64
End: 2019-07-11

## 2019-07-11 VITALS
DIASTOLIC BLOOD PRESSURE: 76 MMHG | HEART RATE: 52 BPM | SYSTOLIC BLOOD PRESSURE: 140 MMHG | TEMPERATURE: 97.3 F | RESPIRATION RATE: 18 BRPM | OXYGEN SATURATION: 96 %

## 2019-07-11 DIAGNOSIS — M47.816 LUMBAR SPONDYLOSIS: ICD-10-CM

## 2019-07-11 PROCEDURE — 64636 DESTROY L/S FACET JNT ADDL: CPT | Performed by: ANESTHESIOLOGY

## 2019-07-11 PROCEDURE — 64635 DESTROY LUMB/SAC FACET JNT: CPT | Performed by: ANESTHESIOLOGY

## 2019-07-11 RX ORDER — BUPIVACAINE HCL/PF 2.5 MG/ML
5 VIAL (ML) INJECTION ONCE
Status: COMPLETED | OUTPATIENT
Start: 2019-07-11 | End: 2019-07-11

## 2019-07-11 RX ORDER — METHYLPREDNISOLONE ACETATE 80 MG/ML
80 INJECTION, SUSPENSION INTRA-ARTICULAR; INTRALESIONAL; INTRAMUSCULAR; PARENTERAL; SOFT TISSUE ONCE
Status: COMPLETED | OUTPATIENT
Start: 2019-07-11 | End: 2019-07-11

## 2019-07-11 RX ORDER — LIDOCAINE HYDROCHLORIDE 10 MG/ML
5 INJECTION, SOLUTION EPIDURAL; INFILTRATION; INTRACAUDAL; PERINEURAL ONCE
Status: COMPLETED | OUTPATIENT
Start: 2019-07-11 | End: 2019-07-11

## 2019-07-11 RX ADMIN — Medication 5 ML: at 14:05

## 2019-07-11 RX ADMIN — METHYLPREDNISOLONE ACETATE 80 MG: 80 INJECTION, SUSPENSION INTRA-ARTICULAR; INTRALESIONAL; INTRAMUSCULAR; PARENTERAL; SOFT TISSUE at 14:05

## 2019-07-11 RX ADMIN — LIDOCAINE HYDROCHLORIDE 5 ML: 10 INJECTION, SOLUTION EPIDURAL; INFILTRATION; INTRACAUDAL; PERINEURAL at 14:05

## 2019-07-11 NOTE — DISCHARGE INSTR - LAB

## 2019-07-12 NOTE — TELEPHONE ENCOUNTER
AYSE    S/w pt  Doing great today  Denies complications  Aware of 8/1 appt for left sided RFA  Will call if she needs anything prior

## 2019-08-01 ENCOUNTER — HOSPITAL ENCOUNTER (OUTPATIENT)
Dept: RADIOLOGY | Facility: CLINIC | Age: 64
Discharge: HOME | End: 2019-08-01
Attending: ANESTHESIOLOGY | Admitting: ANESTHESIOLOGY
Payer: COMMERCIAL

## 2019-08-01 ENCOUNTER — TELEPHONE (OUTPATIENT)
Dept: PAIN MEDICINE | Facility: CLINIC | Age: 64
End: 2019-08-01

## 2019-08-01 VITALS
DIASTOLIC BLOOD PRESSURE: 79 MMHG | TEMPERATURE: 97.4 F | RESPIRATION RATE: 18 BRPM | HEART RATE: 54 BPM | SYSTOLIC BLOOD PRESSURE: 123 MMHG | OXYGEN SATURATION: 97 %

## 2019-08-01 DIAGNOSIS — M47.816 LUMBAR SPONDYLOSIS: ICD-10-CM

## 2019-08-01 PROCEDURE — 64636 DESTROY L/S FACET JNT ADDL: CPT | Performed by: ANESTHESIOLOGY

## 2019-08-01 PROCEDURE — 64635 DESTROY LUMB/SAC FACET JNT: CPT | Performed by: ANESTHESIOLOGY

## 2019-08-01 RX ORDER — METHYLPREDNISOLONE ACETATE 80 MG/ML
80 INJECTION, SUSPENSION INTRA-ARTICULAR; INTRALESIONAL; INTRAMUSCULAR; PARENTERAL; SOFT TISSUE ONCE
Status: COMPLETED | OUTPATIENT
Start: 2019-08-01 | End: 2019-08-01

## 2019-08-01 RX ORDER — BUPIVACAINE HCL/PF 2.5 MG/ML
5 VIAL (ML) INJECTION ONCE
Status: COMPLETED | OUTPATIENT
Start: 2019-08-01 | End: 2019-08-01

## 2019-08-01 RX ORDER — LIDOCAINE HYDROCHLORIDE 10 MG/ML
5 INJECTION, SOLUTION EPIDURAL; INFILTRATION; INTRACAUDAL; PERINEURAL ONCE
Status: COMPLETED | OUTPATIENT
Start: 2019-08-01 | End: 2019-08-01

## 2019-08-01 RX ADMIN — METHYLPREDNISOLONE ACETATE 80 MG: 80 INJECTION, SUSPENSION INTRA-ARTICULAR; INTRALESIONAL; INTRAMUSCULAR; PARENTERAL; SOFT TISSUE at 13:32

## 2019-08-01 RX ADMIN — Medication 5 ML: at 13:32

## 2019-08-01 RX ADMIN — LIDOCAINE HYDROCHLORIDE 5 ML: 10 INJECTION, SOLUTION EPIDURAL; INFILTRATION; INTRACAUDAL; PERINEURAL at 13:32

## 2019-08-01 NOTE — DISCHARGE INSTR - LAB

## 2019-08-01 NOTE — TELEPHONE ENCOUNTER
To be called on 8/2    Pt is s/p L L3-5 RFA on 8/1/19   Pt has f/u scheduled for 9/20/19 to arrive at 3:15 pm

## 2019-08-01 NOTE — H&P
History of Present Illness: The patient is a 62 y o  female who presents with complaints of low back pain  Patient Active Problem List   Diagnosis    Lumbar radiculopathy    Chronic pain syndrome    Primary osteoarthritis of right knee    Lumbar spondylosis - Bilateral       Past Medical History:   Diagnosis Date    Arthritis     Heart disease     Hypertension        No past surgical history on file  Current Outpatient Medications:     amLODIPine (NORVASC) 5 mg tablet, Take 5 mg by mouth, Disp: , Rfl:     carvedilol (COREG) 6 25 mg tablet, Take 6 25 mg by mouth, Disp: , Rfl:     furosemide (LASIX) 40 mg tablet, Take 20 mg by mouth daily, Disp: , Rfl:     gabapentin (NEURONTIN) 800 mg tablet, TAKE 1 TABLET 3 TIMES A DAY  , Disp: , Rfl:     HYDROcodone-acetaminophen (NORCO) 5-325 mg per tablet, Take 1 tablet by mouth 3 (three) times a day as needed, Disp: , Rfl: 0    lisinopril (ZESTRIL) 5 mg tablet, Take 5 mg by mouth daily, Disp: , Rfl: 0    losartan (COZAAR) 50 mg tablet, Take 50 mg by mouth daily, Disp: , Rfl: 3    methimazole (TAPAZOLE) 10 mg tablet, Take 10 mg by mouth 2 (two) times a day, Disp: , Rfl: 0    metoprolol tartrate (LOPRESSOR) 100 mg tablet, TAKE 1 AND 1/2 TABLET TABLET TWICE A DAY, Disp: , Rfl: 1    XARELTO 20 MG tablet, Take 20 mg by mouth daily with dinner, Disp: , Rfl: 5    Allergies   Allergen Reactions    Latex Hives    Oxycodone-Acetaminophen Other (See Comments) and Hives    Sulfa Antibiotics Hives       Physical Exam: There were no vitals filed for this visit  General: Awake, Alert, Oriented x 3, Mood and affect appropriate  Respiratory: Respirations even and unlabored  Cardiovascular: Peripheral pulses intact; no edema  Musculoskeletal Exam: wnl    ASA Score: 2         Assessment:   1   Lumbar spondylosis        Plan: LEFT L3-L5 RFA

## 2019-08-21 ENCOUNTER — TRANSCRIBE ORDERS (OUTPATIENT)
Dept: ADMINISTRATIVE | Facility: HOSPITAL | Age: 64
End: 2019-08-21

## 2019-08-21 DIAGNOSIS — M17.11 OSTEOARTHRITIS OF RIGHT KNEE, UNSPECIFIED OSTEOARTHRITIS TYPE: Primary | ICD-10-CM

## 2019-10-14 ENCOUNTER — APPOINTMENT (OUTPATIENT)
Dept: RADIOLOGY | Facility: CLINIC | Age: 64
End: 2019-10-14
Payer: COMMERCIAL

## 2019-10-14 DIAGNOSIS — M17.11 OSTEOARTHRITIS OF RIGHT KNEE, UNSPECIFIED OSTEOARTHRITIS TYPE: ICD-10-CM

## 2019-10-14 DIAGNOSIS — M17.12 OSTEOARTHRITIS OF LEFT KNEE, UNSPECIFIED OSTEOARTHRITIS TYPE: ICD-10-CM

## 2019-10-14 PROCEDURE — 73564 X-RAY EXAM KNEE 4 OR MORE: CPT

## 2019-10-15 ENCOUNTER — TELEPHONE (OUTPATIENT)
Dept: PAIN MEDICINE | Facility: CLINIC | Age: 64
End: 2019-10-15

## 2020-06-22 ENCOUNTER — TRANSCRIBE ORDERS (OUTPATIENT)
Dept: ADMINISTRATIVE | Facility: HOSPITAL | Age: 65
End: 2020-06-22

## 2020-06-22 DIAGNOSIS — M25.561 RIGHT KNEE PAIN, UNSPECIFIED CHRONICITY: Primary | ICD-10-CM

## 2020-06-22 DIAGNOSIS — M25.562 LEFT KNEE PAIN, UNSPECIFIED CHRONICITY: ICD-10-CM

## 2020-07-20 ENCOUNTER — APPOINTMENT (OUTPATIENT)
Dept: RADIOLOGY | Facility: CLINIC | Age: 65
End: 2020-07-20
Payer: COMMERCIAL

## 2020-07-20 DIAGNOSIS — M25.562 LEFT KNEE PAIN, UNSPECIFIED CHRONICITY: ICD-10-CM

## 2020-07-20 DIAGNOSIS — M25.561 RIGHT KNEE PAIN, UNSPECIFIED CHRONICITY: ICD-10-CM

## 2020-07-20 PROCEDURE — 73564 X-RAY EXAM KNEE 4 OR MORE: CPT

## 2024-01-29 NOTE — TELEPHONE ENCOUNTER
Lm on vm for pt to c/b  Pls reschedule 10/31 appt  Letter sent  [Menstruating] : The patient is menstruating [Menarche Age ____] : age at menarche was [unfilled] [History of Hormone Replacement Treatment] : has no history of hormone replacement treatment [Definite ___ (Date)] : the last menstrual period was [unfilled] [Total Preg ___] : G[unfilled] [Live Births ___] : P[unfilled]  [Age At Live Birth ___] : Age at live birth: [unfilled] [de-identified] : BRA SIZE: 34B